# Patient Record
Sex: FEMALE | Race: WHITE | NOT HISPANIC OR LATINO | Employment: FULL TIME | URBAN - METROPOLITAN AREA
[De-identification: names, ages, dates, MRNs, and addresses within clinical notes are randomized per-mention and may not be internally consistent; named-entity substitution may affect disease eponyms.]

---

## 2019-11-11 ENCOUNTER — APPOINTMENT (OUTPATIENT)
Dept: RADIOLOGY | Facility: CLINIC | Age: 44
End: 2019-11-11
Payer: COMMERCIAL

## 2019-11-11 ENCOUNTER — PREP FOR PROCEDURE (OUTPATIENT)
Dept: OBGYN CLINIC | Facility: CLINIC | Age: 44
End: 2019-11-11

## 2019-11-11 ENCOUNTER — OFFICE VISIT (OUTPATIENT)
Dept: OBGYN CLINIC | Facility: CLINIC | Age: 44
End: 2019-11-11
Payer: COMMERCIAL

## 2019-11-11 VITALS — WEIGHT: 140 LBS | DIASTOLIC BLOOD PRESSURE: 78 MMHG | SYSTOLIC BLOOD PRESSURE: 120 MMHG

## 2019-11-11 DIAGNOSIS — M79.641 PAIN OF RIGHT HAND: ICD-10-CM

## 2019-11-11 DIAGNOSIS — R22.31 MASS OF FINGER OF RIGHT HAND: ICD-10-CM

## 2019-11-11 DIAGNOSIS — M65.9 SYNOVITIS OF HAND: Primary | ICD-10-CM

## 2019-11-11 PROCEDURE — 99203 OFFICE O/P NEW LOW 30 MIN: CPT | Performed by: ORTHOPAEDIC SURGERY

## 2019-11-11 PROCEDURE — 73130 X-RAY EXAM OF HAND: CPT

## 2019-11-11 RX ORDER — LINACLOTIDE 290 UG/1
CAPSULE, GELATIN COATED ORAL DAILY
COMMUNITY
Start: 2019-11-08

## 2019-11-11 RX ORDER — LIDOCAINE HYDROCHLORIDE AND EPINEPHRINE 10; 10 MG/ML; UG/ML
20 INJECTION, SOLUTION INFILTRATION; PERINEURAL ONCE
Status: CANCELLED | OUTPATIENT
Start: 2019-11-11 | End: 2019-11-11

## 2019-11-11 NOTE — H&P
ASSESSMENT/PLAN:    Assessment:   Right thumb CMC jt synovitis   Right index finger PIP joint mass    Plan:   The patient will be given a Comfort Cool brace to wear for activities especially while going to the gym in regards to her CMC synovitis  Patient would like to proceed with a right index finger PIP joint mass excision under local     Follow Up: After Surgery    To Do Next Visit:    and Sutures out    General Discussions:       Operative Discussions:     Ganglion Cyst Excision: The anatomy and physiology of the ganglion was discussed with the patient today in the office  Fluid leaking out of the joint surface typically creates a small sac, which can enlarge and cause pain or limitation of motion  Treatment options include observation, aspiration, or surgical incision were discussed with the patient today  Observation typically lead to resolution and approximately 10% of patients, aspiration least resolution approximately 50% of patients, and surgical excision lead to resolution in approximately 97% of patients  After discussion with the patient today, the patient voiced understanding of all treatment options  The patient has elected excision of the ganglion  The risks and benefits of the procedure were explained to the patient, which include, but are not limited to: Bleeding, infection, recurrence, pain, scar, damage to tendons, damage to nerves, and damage to blood vessels, failure to give desired results and complications related to anesthesia   These risks, along with alternative conservative treatment options, and postoperative protocols were voiced back and understood by the patient   All questions were answered to the patient's satisfaction   The patient agrees to comply with a standard postoperative protocol, and is willing to proceed   Education was provided via written and auditory forms   There were no barriers to learning   Written handouts regarding wound care, incision and scar care, and general preoperative information was provided to the patient   Prior to surgery, the patient may be requested to stop all anti-inflammatory medications   Prophylactic aspirin, Plavix, and Coumadin may be allowed to be continued   Medications including vitamin E , ginkgo, and fish oil are requested to be stopped approximately one week prior to surgery   Hypertensive medications and beta blockers, if taken, should be continued  _____________________________________________________  CHIEF COMPLAINT:  Chief Complaint   Patient presents with    Right Index Finger - Pain         SUBJECTIVE:  Nadeem López is a 38 y o  female who presents with Pain  Moderate  Constant  Aching to the right index finger and thumb  This started  6 month(s) ago as Sudden  Radiation: Yes to the  index finger and thumb  Previous Treatments: activity modification with only partial relief  Associated symptoms: No Complaints    PAST MEDICAL HISTORY:  No past medical history on file  PAST SURGICAL HISTORY:  Past Surgical History:   Procedure Laterality Date    BREAST RECONSTRUCTION       SECTION         FAMILY HISTORY:  No family history on file  SOCIAL HISTORY:  Social History     Tobacco Use    Smoking status: Never Smoker    Smokeless tobacco: Never Used   Substance Use Topics    Alcohol use: Yes     Alcohol/week: 3 0 - 4 0 standard drinks     Types: 3 - 4 Glasses of wine per week    Drug use: Not Currently       MEDICATIONS:    Current Outpatient Medications:     LINZESS 290 MCG CAPS, , Disp: , Rfl:     ALLERGIES:  No Known Allergies    REVIEW OF SYSTEMS:  Pertinent items are noted in HPI      LABS:  HgA1c: No results found for: HGBA1C  BMP: No results found for: GLUCOSE, CALCIUM, NA, K, CO2, CL, BUN, CREATININE      _____________________________________________________  PHYSICAL EXAMINATION:  Vital signs: /78   Wt 63 5 kg (140 lb)   General: well developed and well nourished, alert, oriented times 3 and appears comfortable  Psychiatric: Normal  HEENT: Trachea Midline, No torticollis  Cardiovascular: No discernable arrhythmia  Pulmonary: No wheezing or stridor  Skin: No Erythema  Neurovascular: Sensation Intact to the Median, Ulnar, Radial Nerve, Motor Intact to the Median, Ulnar, Radial Nerve and Pulses Intact    MUSCULOSKELETAL EXAMINATION:  RIGHT SIDE:  CMC: Negative Grind, No Instability and Positive tendnerness CMC Index finger: full composite fist formation, mass [id number] localized over pip jt of index finger, tenderness to mass    _____________________________________________________  STUDIES REVIEWED:  Images were reviewd in PACS:  X-ray of right hand on 11/11/2019 demonstrates no arthritic changes or acute fractures or dislocations        PROCEDURES PERFORMED:  Procedures  No Procedures performed today   Scribe Attestation    I,:   Rosalina Ramirez am acting as a scribe while in the presence of the attending physician :        I,:   Ana Crowder MD personally performed the services described in this documentation    as scribed in my presence :

## 2019-11-11 NOTE — PROGRESS NOTES
ASSESSMENT/PLAN:    Assessment:   Right thumb CMC jt synovitis   Right index finger PIP joint mass    Plan:   The patient will be given a Comfort Cool brace to wear for activities especially while going to the gym in regards to her CMC synovitis  Patient would like to proceed with a right index finger PIP joint mass excision under local     Follow Up: After Surgery    To Do Next Visit:    and Sutures out    General Discussions:       Operative Discussions:     Ganglion Cyst Excision: The anatomy and physiology of the ganglion was discussed with the patient today in the office  Fluid leaking out of the joint surface typically creates a small sac, which can enlarge and cause pain or limitation of motion  Treatment options include observation, aspiration, or surgical incision were discussed with the patient today  Observation typically lead to resolution and approximately 10% of patients, aspiration least resolution approximately 50% of patients, and surgical excision lead to resolution in approximately 97% of patients  After discussion with the patient today, the patient voiced understanding of all treatment options  The patient has elected excision of the ganglion  The risks and benefits of the procedure were explained to the patient, which include, but are not limited to: Bleeding, infection, recurrence, pain, scar, damage to tendons, damage to nerves, and damage to blood vessels, failure to give desired results and complications related to anesthesia   These risks, along with alternative conservative treatment options, and postoperative protocols were voiced back and understood by the patient   All questions were answered to the patient's satisfaction   The patient agrees to comply with a standard postoperative protocol, and is willing to proceed   Education was provided via written and auditory forms   There were no barriers to learning   Written handouts regarding wound care, incision and scar care, and general preoperative information was provided to the patient   Prior to surgery, the patient may be requested to stop all anti-inflammatory medications   Prophylactic aspirin, Plavix, and Coumadin may be allowed to be continued   Medications including vitamin E , ginkgo, and fish oil are requested to be stopped approximately one week prior to surgery   Hypertensive medications and beta blockers, if taken, should be continued  _____________________________________________________  CHIEF COMPLAINT:  Chief Complaint   Patient presents with    Right Index Finger - Pain         SUBJECTIVE:  Garland Honeycutt is a 38 y o  female who presents with Pain  Moderate  Constant  Aching to the right index finger and thumb  This started  6 month(s) ago as Sudden  Radiation: Yes to the  index finger and thumb  Previous Treatments: activity modification with only partial relief  Associated symptoms: No Complaints    PAST MEDICAL HISTORY:  No past medical history on file  PAST SURGICAL HISTORY:  Past Surgical History:   Procedure Laterality Date    BREAST RECONSTRUCTION       SECTION         FAMILY HISTORY:  No family history on file  SOCIAL HISTORY:  Social History     Tobacco Use    Smoking status: Never Smoker    Smokeless tobacco: Never Used   Substance Use Topics    Alcohol use: Yes     Alcohol/week: 3 0 - 4 0 standard drinks     Types: 3 - 4 Glasses of wine per week    Drug use: Not Currently       MEDICATIONS:    Current Outpatient Medications:     LINZESS 290 MCG CAPS, , Disp: , Rfl:     ALLERGIES:  No Known Allergies    REVIEW OF SYSTEMS:  Pertinent items are noted in HPI      LABS:  HgA1c: No results found for: HGBA1C  BMP: No results found for: GLUCOSE, CALCIUM, NA, K, CO2, CL, BUN, CREATININE      _____________________________________________________  PHYSICAL EXAMINATION:  Vital signs: /78   Wt 63 5 kg (140 lb)   General: well developed and well nourished, alert, oriented times 3 and appears comfortable  Psychiatric: Normal  HEENT: Trachea Midline, No torticollis  Cardiovascular: No discernable arrhythmia  Pulmonary: No wheezing or stridor  Skin: No Erythema  Neurovascular: Sensation Intact to the Median, Ulnar, Radial Nerve, Motor Intact to the Median, Ulnar, Radial Nerve and Pulses Intact    MUSCULOSKELETAL EXAMINATION:  RIGHT SIDE:  CMC: Negative Grind, No Instability and Positive tendnerness CMC Index finger: full composite fist formation, mass [id number] localized over pip jt of index finger, tenderness to mass    _____________________________________________________  STUDIES REVIEWED:  Images were reviewd in PACS:  X-ray of right hand on 11/11/2019 demonstrates no arthritic changes or acute fractures or dislocations        PROCEDURES PERFORMED:  Procedures  No Procedures performed today   Scribe Attestation    I,:   Racheal Gonzalez am acting as a scribe while in the presence of the attending physician :        I,:   Jimmy Delatorre MD personally performed the services described in this documentation    as scribed in my presence :

## 2020-01-27 NOTE — PRE-PROCEDURE INSTRUCTIONS
No outpatient medications have been marked as taking for the 1/30/20 encounter [facility] Encounter)  Pre shower with hibiclens as instructed by surgeon  You may drive yourself to the hospital   You may take your medications day of surgery  You may eat on the day of your surgery  You may have a dressing, please wear something that will fit over it

## 2020-01-30 ENCOUNTER — HOSPITAL ENCOUNTER (OUTPATIENT)
Facility: HOSPITAL | Age: 45
Setting detail: OUTPATIENT SURGERY
Discharge: HOME | End: 2020-01-30
Attending: ORTHOPAEDIC SURGERY | Admitting: ORTHOPAEDIC SURGERY
Payer: COMMERCIAL

## 2020-01-30 VITALS
DIASTOLIC BLOOD PRESSURE: 74 MMHG | SYSTOLIC BLOOD PRESSURE: 118 MMHG | TEMPERATURE: 98 F | WEIGHT: 135 LBS | HEIGHT: 61 IN | BODY MASS INDEX: 25.49 KG/M2 | RESPIRATION RATE: 16 BRPM | OXYGEN SATURATION: 100 % | HEART RATE: 63 BPM

## 2020-01-30 DIAGNOSIS — R22.31 MASS OF FINGER OF RIGHT HAND: Primary | ICD-10-CM

## 2020-01-30 PROCEDURE — 88304 TISSUE EXAM BY PATHOLOGIST: CPT | Performed by: PATHOLOGY

## 2020-01-30 PROCEDURE — 99024 POSTOP FOLLOW-UP VISIT: CPT | Performed by: ORTHOPAEDIC SURGERY

## 2020-01-30 PROCEDURE — 26160 REMOVE TENDON SHEATH LESION: CPT | Performed by: ORTHOPAEDIC SURGERY

## 2020-01-30 PROCEDURE — 26160 REMOVE TENDON SHEATH LESION: CPT | Performed by: PHYSICIAN ASSISTANT

## 2020-01-30 RX ORDER — NAPROXEN SODIUM 220 MG
220 TABLET ORAL 2 TIMES DAILY WITH MEALS
Qty: 10 TABLET | Refills: 0 | Status: SHIPPED | OUTPATIENT
Start: 2020-01-30 | End: 2020-02-10

## 2020-01-30 RX ORDER — IBUPROFEN 200 MG
400 TABLET ORAL EVERY 6 HOURS PRN
COMMUNITY
End: 2020-01-30 | Stop reason: HOSPADM

## 2020-01-30 RX ORDER — HYDROCODONE BITARTRATE AND ACETAMINOPHEN 5; 325 MG/1; MG/1
1 TABLET ORAL EVERY 6 HOURS PRN
Qty: 5 TABLET | Refills: 0 | Status: SHIPPED | OUTPATIENT
Start: 2020-01-30 | End: 2020-02-04

## 2020-01-30 RX ORDER — LIDOCAINE HYDROCHLORIDE AND EPINEPHRINE 10; 10 MG/ML; UG/ML
20 INJECTION, SOLUTION INFILTRATION; PERINEURAL ONCE
Status: COMPLETED | OUTPATIENT
Start: 2020-01-30 | End: 2020-01-30

## 2020-01-30 RX ORDER — SENNOSIDES 8.6 MG
650 CAPSULE ORAL EVERY 8 HOURS
Qty: 15 TABLET | Refills: 0 | Status: SHIPPED | OUTPATIENT
Start: 2020-01-30

## 2020-01-30 RX ADMIN — LIDOCAINE HYDROCHLORIDE AND EPINEPHRINE 5.5 ML: 10; 10 INJECTION, SOLUTION INFILTRATION; PERINEURAL at 08:22

## 2020-01-30 NOTE — OP NOTE
OPERATIVE REPORT  PATIENT NAME: Kiara Ballard  :  1975  MRN: [id number]  Pt Location:  MAIN OR    SURGERY DATE: 20    Surgeon(s) and Role:     * Diana Graff MD - Primary     * Bertha Hernandez PA-C - Assisting    Pre-Op Diagnosis:  Mass of finger of right hand [R22 31]    Post-Op Diagnosis Codes:     * Mass of finger of right hand [R22 31]    Procedure(s):  EXCISION BIOPSY TISSUE LESION/MASS UPPER EXTREMITY right index finger pip jt (Right)    Specimen(s):  Order Name Source Comment Collection Info Order Time   TISSUE EXAM Finger, Right  Collected By: Diana Graff MD 2020  9:07 AM       Estimated Blood Loss:   Minimal      Anesthesia Type:   Local    Operative Indications: The patient has a history of right index finger mass that was recalcitrant to conservative management  The decision was made to bring the patient to the operating room for right index finger mass excision  Risks of the procedure were explained which include, but are not limited to bleeding; infection; damage to nerves, arteries,veins, tendons; scar; pain; need for reoperation; failure to give desired result; and risks of anaesthesia  All questions were answered to satisfaction and they were willing to proceed  Operative Findings:  Fibroma measuring 6 mm x 4 mm x 3 mm along the dorsal aspect of the right index finger proximal interphalangeal joint    Complications:   None    Procedure and Technique:  After the patient, site, and procedure were identified, the patient was brought into the operating room in a supine position  Local anaesthesia was adminstered in the preoperative holding area  A tourniquet was not used  The  right upper extremity was then prepped and drapped in a normal, sterile, orthopedic fashion  After the patient, site, and procedure were identified attention was turned towards the right hand    At this point, a dorsal longitudinal incision was made over the proximal interphalangeal joint of the right index finger  We dissected down through the skin subcutaneous tissues elevating radial ulnar based skin flaps  There was taken to protect the superficial venous structures  The extensor tendon was then identified and a small fibroma measuring 6 mm x 4 mm x 3 mm was identified along the central aspect directly over the proximal interphalangeal joint  With care taken to provide event injury into the extensor tendon, the fibroma was sharply excised off of the dorsal aspect of the extensor tendon  At this point  We were satisfied with the excision and this was sent for routine pathologic evaluation  The patient was demonstrated the mass as well as the extensor tendon  Patient had full flexion and full extension without any injury noted  Full range of motion was also noted  At the completion of the procedure, hemostasis was obtained with cautery and direct pressure  The wounds were copiously irrigated with sterile solution  The wounds were closed with Prolene  Sterile dressings were applied, including Xeroform, gauze, and a finger dressing  Please note, all sponge, needle, and instrument counts were correct prior to closure  Loupe magnification was utilized  The patient tolerated the procedure well       I was present for the entire procedure, A qualified resident physician was not available and A physician assistant was required during the procedure for retraction tissue handling,dissection and suturing    Patient Disposition:  APU and hemodynamically stable    SIGNATURE: Carlos Macario MD  DATE: 01/30/20  TIME: 9:07 AM

## 2020-01-30 NOTE — H&P
H&P Exam - Orthopedics   Cornel Westfall 38 y o  female MRN: [id number]  Unit/Bed#: [facility] 06    Assessment/Plan   Assessment:  RIGHT INDEX FINGER PROXIMAL INTERPHALANGEAL JOINT MASS  Plan:  Excision mass right index finger proximal interphalangeal joint    History of Present Illness   HPI:  Cornel Westfall is a 38 y o  female who presents with right index finger proximal interphalangeal joint mass for excision today  Historical Information  Review Of Systems:   · Skin: Normal  · Neuro: See HPI  · Musculoskeletal: See HPI  · 14 point review of systems negative except as stated above     Past Medical History:   History reviewed  No pertinent past medical history      Past Surgical History:   Past Surgical History:   Procedure Laterality Date    BREAST RECONSTRUCTION       SECTION         Family History:  Family history reviewed and non-contributory  Family History   Problem Relation Age of Onset    Hypertension Father        Social History:  Social History     Socioeconomic History    Marital status: /Civil Union     Spouse name: None    Number of children: None    Years of education: None    Highest education level: None   Occupational History    None   Social Needs    Financial resource strain: None    Food insecurity:     Worry: None     Inability: None    Transportation needs:     Medical: None     Non-medical: None   Tobacco Use    Smoking status: Never Smoker    Smokeless tobacco: Never Used   Substance and Sexual Activity    Alcohol use: Not Currently    Drug use: Not Currently    Sexual activity: None   Lifestyle    Physical activity:     Days per week: None     Minutes per session: None    Stress: None   Relationships    Social connections:     Talks on phone: None     Gets together: None     Attends [employer] service: None     Active member of club or organization: None     Attends meetings of clubs or organizations: None     Relationship status: None    Intimate partner violence:     Fear of current or ex partner: None     Emotionally abused: None     Physically abused: None     Forced sexual activity: None   Other Topics Concern    None   Social History Narrative    None       Allergies:   No Known Allergies        Labs:  No results found for: HCT, HGB, PT, INR, WBC, ESR, CRP    Meds:  No current facility-administered medications for this encounter  Blood Culture:   No results found for: BLOODCX    Wound Culture:   No results found for: WOUNDCULT    Ins and Outs:  No intake/output data recorded  Physical Exam  /71   Pulse 66   Temp 98 °F (36 7 °C) (Oral)   Resp 16   Ht 5' 1" (1 549 m)   Wt 61 2 kg (135 lb)   SpO2 98%   BMI 25 51 kg/m²   /71   Pulse 66   Temp 98 °F (36 7 °C) (Oral)   Resp 16   Ht 5' 1" (1 549 m)   Wt 61 2 kg (135 lb)   SpO2 98%   BMI 25 51 kg/m²   Gen: Alert and oriented to person, place, time  HEENT: EOMI, eyes clear, moist mucus membranes, hearing intact  Respiratory: Bilateral chest rise  No audible wheezing found  Cardiovascular: Regular Rate and Rhythm  Abdomen: soft nontender/nondistended  Ortho Exam:  Small mass localized over proximal interphalangeal joint right index finger  Full composite fist formation noted  Positive CMC arthritis  Neuro Exam:   The patient is neurovascularly intact in the median, ulnar, and radial nerve distribution  There is normal sensation and good capillary refill within the digits  2+ pulses              Lab Results: Reviewed  Imaging: Reviewed

## 2020-02-10 ENCOUNTER — OFFICE VISIT (OUTPATIENT)
Dept: OBGYN CLINIC | Facility: CLINIC | Age: 45
End: 2020-02-10

## 2020-02-10 VITALS — BODY MASS INDEX: 25.51 KG/M2 | HEIGHT: 61 IN | SYSTOLIC BLOOD PRESSURE: 114 MMHG | DIASTOLIC BLOOD PRESSURE: 72 MMHG

## 2020-02-10 DIAGNOSIS — R22.31 MASS OF FINGER OF RIGHT HAND: Primary | ICD-10-CM

## 2020-02-10 PROCEDURE — 99024 POSTOP FOLLOW-UP VISIT: CPT | Performed by: ORTHOPAEDIC SURGERY

## 2020-02-10 NOTE — PROGRESS NOTES
ASSESSMENT/PLAN:    Assessment:   S/P R Index Finger Mass Excision 2020    Plan:   Patient can resume activities as tolerated with no restrictions  She can follow up with us if symptoms worsen or if she has any questions  Follow Up:  PRN  _______________________________________________  CHIEF COMPLAINT:  Chief Complaint   Patient presents with    Right Index Finger - Post-op         SUBJECTIVE:  Génesis Davies is a 38 y o  female who presents for follow up s/p right index finger mass excision on   Pathology showed fibroma  She has had no problems since surgery  No fevers, sweats, chills, fractures, or foul-smelling odor  Pain is well controlled and she has no concerns currently  PAST MEDICAL HISTORY:  History reviewed  No pertinent past medical history  PAST SURGICAL HISTORY:  Past Surgical History:   Procedure Laterality Date    BREAST RECONSTRUCTION       SECTION      MASS EXCISION Right 2020    Procedure: EXCISION BIOPSY TISSUE LESION/MASS UPPER EXTREMITY right index finger pip jt;  Surgeon: Dominic Kimbrough MD;  Location[facility];  Service: Orthopedics       FAMILY HISTORY:  Family History   Problem Relation Age of Onset    Hypertension Father        SOCIAL HISTORY:  Social History     Tobacco Use    Smoking status: Never Smoker    Smokeless tobacco: Never Used   Substance Use Topics    Alcohol use: Not Currently    Drug use: Not Currently       MEDICATIONS:    Current Outpatient Medications:     acetaminophen (TYLENOL 8 HOUR) 650 mg CR tablet, Take 1 tablet (650 mg total) by mouth every 8 (eight) hours, Disp: 15 tablet, Rfl: 0    LINZESS 290 MCG CAPS, daily , Disp: , Rfl:     naproxen sodium (ALEVE) 220 MG tablet, Take 1 tablet (220 mg total) by mouth 2 (two) times a day with meals for 5 days, Disp: 10 tablet, Rfl: 0    ALLERGIES:  No Known Allergies    REVIEW OF SYSTEMS:  Pertinent items are noted in HPI    A comprehensive review of systems was negative  LABS:  HgA1c: No results found for: HGBA1C  BMP: No results found for: GLUCOSE, CALCIUM, NA, K, CO2, CL, BUN, CREATININE        _____________________________________________________  PHYSICAL EXAMINATION:  Vital signs: /72   Ht 5' 1" (1 549 m)   BMI 25 51 kg/m²   General: well developed and well nourished, alert, oriented times 3 and appears comfortable  Psychiatric: Normal  HEENT: Trachea Midline, No torticollis  Cardiovascular: No discernable arrhythmia  Pulmonary: No wheezing or stridor  Skin: No masses, erythema, lacerations, fluctation, ulcerations  Neurovascular: Sensation Intact to the Median, Ulnar, Radial Nerve, Motor Intact to the Median, Ulnar, Radial Nerve and Pulses Intact    MUSCULOSKELETAL EXAMINATION:  Right Hand:  Sutures removed and steri strips placed  Incision CDI  Able to flex and extend her right index finger MCP, PIP, and DIP joints with minimal pain  Fingers warm well perfused    _____________________________________________________  STUDIES REVIEWED:  No Studies to review      PROCEDURES PERFORMED:  No Procedures performed today     I interviewed, took the history and examined the patient  I discuss the case with the resident and reviewed the resident's note  I supervised the resident and I agree with the resident management plan as it was presented to me  I was present in the clinic and examined the patient

## (undated) DEVICE — 3M™ STERI-STRIP™ REINFORCED ADHESIVE SKIN CLOSURES, R1547, 1/2 IN X 4 IN (12 MM X 100 MM), 6 STRIPS/ENVELOPE: Brand: 3M™ STERI-STRIP™

## (undated) DEVICE — INTENDED FOR TISSUE SEPARATION, AND OTHER PROCEDURES THAT REQUIRE A SHARP SURGICAL BLADE TO PUNCTURE OR CUT.: Brand: BARD-PARKER SAFETY BLADES SIZE 15, STERILE

## (undated) DEVICE — GLOVE INDICATOR PI UNDERGLOVE SZ 8 BLUE

## (undated) DEVICE — GLOVE SRG BIOGEL 7.5

## (undated) DEVICE — SPONGE PVP SCRUB WING STERILE

## (undated) DEVICE — STERILE BETHLEHEM PLASTIC HAND: Brand: CARDINAL HEALTH

## (undated) DEVICE — CHLORAPREP HI-LITE 26ML ORANGE